# Patient Record
Sex: MALE | Race: WHITE | NOT HISPANIC OR LATINO | Employment: FULL TIME | URBAN - METROPOLITAN AREA
[De-identification: names, ages, dates, MRNs, and addresses within clinical notes are randomized per-mention and may not be internally consistent; named-entity substitution may affect disease eponyms.]

---

## 2017-09-27 ENCOUNTER — APPOINTMENT (OUTPATIENT)
Dept: RADIOLOGY | Facility: MEDICAL CENTER | Age: 42
End: 2017-09-27
Attending: ORTHOPAEDIC SURGERY
Payer: COMMERCIAL

## 2017-09-27 DIAGNOSIS — M23.619: ICD-10-CM

## 2017-09-27 PROCEDURE — 73721 MRI JNT OF LWR EXTRE W/O DYE: CPT | Mod: LT

## 2017-10-25 ENCOUNTER — APPOINTMENT (OUTPATIENT)
Dept: ADMISSIONS | Facility: MEDICAL CENTER | Age: 42
End: 2017-10-25
Attending: ORTHOPAEDIC SURGERY
Payer: COMMERCIAL

## 2017-10-25 RX ORDER — HYDROCODONE BITARTRATE AND ACETAMINOPHEN 5; 325 MG/1; MG/1
1-2 TABLET ORAL EVERY 4 HOURS PRN
COMMUNITY

## 2017-10-25 NOTE — OR NURSING
"Preadmit appointment: \" Preparing for your Procedure information\" sheet given to patient with verbal and written instructions. Patient instructed to continue prescribed medications through the day before surgery, instructed to take the following medications the day of surgery per anesthesia protocol: Hydrocodone if needed.   "

## 2017-11-02 ENCOUNTER — HOSPITAL ENCOUNTER (OUTPATIENT)
Facility: MEDICAL CENTER | Age: 42
End: 2017-11-02
Attending: ORTHOPAEDIC SURGERY | Admitting: ORTHOPAEDIC SURGERY
Payer: COMMERCIAL

## 2017-11-02 VITALS
TEMPERATURE: 98.4 F | SYSTOLIC BLOOD PRESSURE: 131 MMHG | WEIGHT: 173.94 LBS | RESPIRATION RATE: 16 BRPM | BODY MASS INDEX: 25.76 KG/M2 | HEIGHT: 69 IN | HEART RATE: 56 BPM | OXYGEN SATURATION: 95 % | DIASTOLIC BLOOD PRESSURE: 81 MMHG

## 2017-11-02 PROCEDURE — 700101 HCHG RX REV CODE 250

## 2017-11-02 PROCEDURE — 160036 HCHG PACU - EA ADDL 30 MINS PHASE I: Performed by: ORTHOPAEDIC SURGERY

## 2017-11-02 PROCEDURE — 160002 HCHG RECOVERY MINUTES (STAT): Performed by: ORTHOPAEDIC SURGERY

## 2017-11-02 PROCEDURE — 160009 HCHG ANES TIME/MIN: Performed by: ORTHOPAEDIC SURGERY

## 2017-11-02 PROCEDURE — C1713 ANCHOR/SCREW BN/BN,TIS/BN: HCPCS | Performed by: ORTHOPAEDIC SURGERY

## 2017-11-02 PROCEDURE — A6222 GAUZE <=16 IN NO W/SAL W/O B: HCPCS | Performed by: ORTHOPAEDIC SURGERY

## 2017-11-02 PROCEDURE — 500562 HCHG FIBERWIRE: Performed by: ORTHOPAEDIC SURGERY

## 2017-11-02 PROCEDURE — 160041 HCHG SURGERY MINUTES - EA ADDL 1 MIN LEVEL 4: Performed by: ORTHOPAEDIC SURGERY

## 2017-11-02 PROCEDURE — 160025 RECOVERY II MINUTES (STATS): Performed by: ORTHOPAEDIC SURGERY

## 2017-11-02 PROCEDURE — 700105 HCHG RX REV CODE 258: Performed by: ORTHOPAEDIC SURGERY

## 2017-11-02 PROCEDURE — C1762 CONN TISS, HUMAN(INC FASCIA): HCPCS | Performed by: ORTHOPAEDIC SURGERY

## 2017-11-02 PROCEDURE — 500423 HCHG DRESSING, ABD COMBINE: Performed by: ORTHOPAEDIC SURGERY

## 2017-11-02 PROCEDURE — 160029 HCHG SURGERY MINUTES - 1ST 30 MINS LEVEL 4: Performed by: ORTHOPAEDIC SURGERY

## 2017-11-02 PROCEDURE — 700111 HCHG RX REV CODE 636 W/ 250 OVERRIDE (IP)

## 2017-11-02 PROCEDURE — 160022 HCHG BLOCK: Performed by: ORTHOPAEDIC SURGERY

## 2017-11-02 PROCEDURE — 500561: Performed by: ORTHOPAEDIC SURGERY

## 2017-11-02 PROCEDURE — 502580 HCHG PACK, KNEE ARTHROSCOPY: Performed by: ORTHOPAEDIC SURGERY

## 2017-11-02 PROCEDURE — 160047 HCHG PACU  - EA ADDL 30 MINS PHASE II: Performed by: ORTHOPAEDIC SURGERY

## 2017-11-02 PROCEDURE — 160046 HCHG PACU - 1ST 60 MINS PHASE II: Performed by: ORTHOPAEDIC SURGERY

## 2017-11-02 PROCEDURE — 160035 HCHG PACU - 1ST 60 MINS PHASE I: Performed by: ORTHOPAEDIC SURGERY

## 2017-11-02 PROCEDURE — 700102 HCHG RX REV CODE 250 W/ 637 OVERRIDE(OP)

## 2017-11-02 PROCEDURE — 502000 HCHG MISC OR IMPLANTS RC 0278: Performed by: ORTHOPAEDIC SURGERY

## 2017-11-02 PROCEDURE — 501838 HCHG SUTURE GENERAL: Performed by: ORTHOPAEDIC SURGERY

## 2017-11-02 PROCEDURE — A9270 NON-COVERED ITEM OR SERVICE: HCPCS

## 2017-11-02 PROCEDURE — 160048 HCHG OR STATISTICAL LEVEL 1-5: Performed by: ORTHOPAEDIC SURGERY

## 2017-11-02 PROCEDURE — 500028 HCHG ARTHROWAND TURBOVAC 3.5/90 SUCT.: Performed by: ORTHOPAEDIC SURGERY

## 2017-11-02 PROCEDURE — 501507 HCHG SUTURE LASSO: Performed by: ORTHOPAEDIC SURGERY

## 2017-11-02 DEVICE — SCREW BIOSURE 9X25MM: Type: IMPLANTABLE DEVICE | Status: FUNCTIONAL

## 2017-11-02 DEVICE — DEVICE ULTRABUTTON ADJUSTABLE FIXATION: Type: IMPLANTABLE DEVICE | Status: FUNCTIONAL

## 2017-11-02 DEVICE — GRAFT BONE POSTERIOR TIBIALIS TENDON <24CM: Type: IMPLANTABLE DEVICE | Status: FUNCTIONAL

## 2017-11-02 DEVICE — SUTURE ANCHOR FOOTPRINT FIX 4.5MM: Type: IMPLANTABLE DEVICE | Status: FUNCTIONAL

## 2017-11-02 RX ORDER — BUPIVACAINE HYDROCHLORIDE 5 MG/ML
INJECTION, SOLUTION PERINEURAL
Status: DISCONTINUED | OUTPATIENT
Start: 2017-11-02 | End: 2017-11-02 | Stop reason: HOSPADM

## 2017-11-02 RX ORDER — SODIUM CHLORIDE, SODIUM LACTATE, POTASSIUM CHLORIDE, CALCIUM CHLORIDE 600; 310; 30; 20 MG/100ML; MG/100ML; MG/100ML; MG/100ML
1000 INJECTION, SOLUTION INTRAVENOUS
Status: DISCONTINUED | OUTPATIENT
Start: 2017-11-02 | End: 2017-11-02 | Stop reason: HOSPADM

## 2017-11-02 RX ORDER — LIDOCAINE HYDROCHLORIDE 10 MG/ML
INJECTION, SOLUTION INFILTRATION; PERINEURAL
Status: COMPLETED
Start: 2017-11-02 | End: 2017-11-02

## 2017-11-02 RX ORDER — OXYCODONE HYDROCHLORIDE AND ACETAMINOPHEN 5; 325 MG/1; MG/1
TABLET ORAL
Status: COMPLETED
Start: 2017-11-02 | End: 2017-11-02

## 2017-11-02 RX ORDER — ACETAMINOPHEN 500 MG
TABLET ORAL
Status: COMPLETED
Start: 2017-11-02 | End: 2017-11-02

## 2017-11-02 RX ORDER — BACITRACIN 50000 [IU]/1
INJECTION, POWDER, FOR SOLUTION INTRAMUSCULAR
Status: DISCONTINUED | OUTPATIENT
Start: 2017-11-02 | End: 2017-11-02 | Stop reason: HOSPADM

## 2017-11-02 RX ORDER — CELECOXIB 200 MG/1
CAPSULE ORAL
Status: COMPLETED
Start: 2017-11-02 | End: 2017-11-02

## 2017-11-02 RX ADMIN — SODIUM CHLORIDE, POTASSIUM CHLORIDE, SODIUM LACTATE AND CALCIUM CHLORIDE 1000 ML: 600; 310; 30; 20 INJECTION, SOLUTION INTRAVENOUS at 13:05

## 2017-11-02 RX ADMIN — ACETAMINOPHEN 1000 MG: 500 TABLET, COATED ORAL at 13:05

## 2017-11-02 RX ADMIN — CELECOXIB 400 MG: 200 CAPSULE ORAL at 13:05

## 2017-11-02 RX ADMIN — LIDOCAINE HYDROCHLORIDE 0.1 ML: 10 INJECTION, SOLUTION INFILTRATION; PERINEURAL at 13:05

## 2017-11-02 RX ADMIN — OXYCODONE HYDROCHLORIDE AND ACETAMINOPHEN 2 TABLET: 5; 325 TABLET ORAL at 18:14

## 2017-11-02 ASSESSMENT — PAIN SCALES - GENERAL
PAINLEVEL_OUTOF10: 3
PAINLEVEL_OUTOF10: 1
PAINLEVEL_OUTOF10: 3
PAINLEVEL_OUTOF10: 3
PAINLEVEL_OUTOF10: ASSUMED PAIN PRESENT

## 2017-11-03 NOTE — DISCHARGE INSTRUCTIONS
ACTIVITY: Rest and take it easy for the first 24 hours.  A responsible adult is recommended to remain with you during that time.  It is normal to feel sleepy.  We encourage you to not do anything that requires balance, judgment or coordination.    MILD FLU-LIKE SYMPTOMS ARE NORMAL. YOU MAY EXPERIENCE GENERALIZED MUSCLE ACHES, THROAT IRRITATION, HEADACHE AND/OR SOME NAUSEA.    FOR 24 HOURS DO NOT:  Drive, operate machinery or run household appliances.  Drink beer or alcoholic beverages.   Make important decisions or sign legal documents.    SPECIAL INSTRUCTIONS: See attached instruction sheet. Toe touch weight bearing after 24 hours with crutches to left lower extremity.     DIET: To avoid nausea, slowly advance diet as tolerated, avoiding spicy or greasy foods for the first day.  Add more substantial food to your diet according to your physician's instructions. INCREASE FLUIDS AND FIBER TO AVOID CONSTIPATION.    SURGICAL DRESSING/BATHING: Keep dressing clean, dry and intact until instructed otherwise by surgeon    FOLLOW-UP APPOINTMENT:  A follow-up appointment should be arranged with your doctor ; call to schedule.    You should CALL YOUR PHYSICIAN if you develop:  Fever greater than 101 degrees F.  Pain not relieved by medication, or persistent nausea or vomiting.  Excessive bleeding (blood soaking through dressing) or unexpected drainage from the wound.  Extreme redness or swelling around the incision site, drainage of pus or foul smelling drainage.  Inability to urinate or empty your bladder within 8 hours.  Problems with breathing or chest pain.    You should call 911 if you develop problems with breathing or chest pain.  If you are unable to contact your doctor or surgical center, you should go to the nearest emergency room or urgent care center.  Dr Madrid's telephone #: [phone]    If any questions arise, call your doctor.  If your doctor is not available, please feel free to call the Surgical Center at  [phone].  The Center is open Monday through Friday from 7AM to 7PM.  You can also call the HEALTH HOTLINE open 24 hours/day, 7 days/week and speak to a nurse at [phone], or toll free at [phone].    A registered nurse may call you a few days after your surgery to see how you are doing after your procedure.    MEDICATIONS: Resume taking daily medication.  Take prescribed pain medication with food.  If no medication is prescribed, you may take non-aspirin pain medication if needed.  PAIN MEDICATION CAN BE VERY CONSTIPATING.  Take a stool softener or laxative such as senokot, pericolace, or milk of magnesia if needed.    Prescription given for Preoperatively .  Last pain medication given at 6:14pm Percocet 10/650mg    If your physician has prescribed pain medication that includes Acetaminophen (Tylenol), do not take additional Acetaminophen (Tylenol) while taking the prescribed medication.    Peripheral Nerve Block Discharge Instructions from Same Day Surgery and Inpatient :    What to Expect - Lower Extremity  · The block may cause you to experience numbness and weakness in your hip and thigh, thigh and knee or calf and foot on the same side as your surgery  · Numbness, tingling and / or weakness are all normal. For some people, this may be an unpleasant sensation  · These issues will be resolved when the local anesthetic wears off   · You may experience numbness and tingling in your thigh on the same side as your surgery if the block medicine was injected at your groin area  · Numbness will make it difficult to walk  · You may have problems with balance and walking so be very careful   · Follow your surgeon's direction regarding weight bearing on your surgical limb  · Be very careful with your numb limb  Precautions  · The numbness may affect your balance  · Be careful when walking or moving around  · Your leg may be weak: be very careful putting weight on it  · If your surgeon did not specify a  "time, you should not bear weight for 24 hours  · Be sure to ask for help when you need it  · It is better to have help than to fall and hurt yourself  Prevent Injury  · Protect the limb like a baby  · Beware of exposing your limb to extreme heat or cold or trauma  · The limb may be injured without you noticing because it is numb  · Keep the limb elevated whenever possible  · Do not sleep on the limb  · Change the position of the limb regularly  · Avoid putting pressure on your surgical limb  Pain Control  · The initial block on the day of surgery will make your extremity feel \"numb\"  · Any consecutive injection including prior to discharge from the hospital will make your extremity feel \"numb\"  · You may feel an aching or burning when the local anesthesia starts to wear off  · Take pain pills as prescribed by your surgeon  · Call your surgeon or anesthesiologist if you do not have adequate pain control    Depression / Suicide Risk    As you are discharged from this [facility] facility, it is important to learn how to keep safe from harming yourself.    Recognize the warning signs:  · Abrupt changes in personality, positive or negative- including increase in energy   · Giving away possessions  · Change in eating patterns- significant weight changes-  positive or negative  · Change in sleeping patterns- unable to sleep or sleeping all the time   · Unwillingness or inability to communicate  · Depression  · Unusual sadness, discouragement and loneliness  · Talk of wanting to die  · Neglect of personal appearance   · Rebelliousness- reckless behavior  · Withdrawal from people/activities they love  · Confusion- inability to concentrate     If you or a loved one observes any of these behaviors or has concerns about self-harm, here's what you can do:  · Talk about it- your feelings and reasons for harming yourself  · Remove any means that you might use to hurt yourself (examples: pills, rope, extension cords, " firearm)  · Get professional help from the community (Mental Health, Substance Abuse, psychological counseling)  · Do not be alone:Call your Safe Contact- someone whom you trust who will be there for you.  · Call your local CRISIS HOTLINE 490-2038 or 929-835-4851  · Call your local Children's Mobile Crisis Response Team [place] (889) 754-8808 or www.i.TV  · Call the toll free National Suicide Prevention Hotlines   · National Suicide Prevention Lifeline 762-126-BFSN (9732)  National Hope Line Network 800-SUICIDE (955-8774)

## 2017-11-03 NOTE — OR NURSING
1854: Settled in recliner post short ambulation from dalila, pain is tolerable, no nausea, dressing in CDI, immobilizer in place, and polar ice pad in place.  1933: D/Catrachito to care of family post uneventful stay in PACU 2.

## 2017-11-03 NOTE — OP REPORT
DATE OF SERVICE:  11/02/2017    PREOPERATIVE DIAGNOSES:  Left knee anterior cruciate ligament tear and   questionable meniscus tear.    POSTOPERATIVE DIAGNOSES:  Left knee anterior cruciate ligament tear, medial   meniscus tear, lateral meniscus tear, and posterior root disruption.    PROCEDURE:  Left knee ACL reconstruction with soft tissue allograft, partial   medial meniscectomy, and lateral meniscus root repair with partial   meniscectomy of the mid body.    SURGEON:  Chip Mdarid MD    ASSISTANT:  Kayleigh Chao, certified first assist.    ANESTHESIA:  General plus block and local.    ANESTHESIOLOGIST:  Jarrett Conteh MD    TOURNIQUET USE:  None.    BLOOD LOSS:  Minimal.    COMPLICATIONS:  None apparent.    DISPOSITION:  PACU.    CONDITION:  Stable.    INDICATIONS:  The patient is an active 41-year-old male who was playing   basketball at high school, had a noncontact injury for his left knee.    Physical exam consistent with ACL tear, questionable meniscal injury.  ACL   confirmed ACL tear and questionable medial meniscus tear.  We discussed risks,   benefits, and rationale of surgery.  He works physical therapy to achieve   normal range of motion before surgery.  Risks include, but not limited to   infection, neurovascular injury, incomplete relief of symptoms, possible   retear graft, arthrofibrosis, inability to return to pre-injury function, DVT,   PE, and complications of anesthesia.  Informed consent was signed and placed   on the chart.  All of his questions were answered; no guarantees implied or   given.    TECHNIQUE:  Both patient and I agreed to correct upper extremity.  Left leg   was signed and marked in preoperative holding.  He received 2 g IV Ancef   prophylaxis.  I consulted Dr. Jarrett Conteh of anesthesia regarding   perioperative pain management.  He had a discussion with the patient and   consented him for an adductor canal block, which was carried out under sterile    technique in preoperative holding.  The patient was then taken to the   operative suite.  After adequate anesthesia, time-out was taken by all in the   room to identify the correct patient, limb, and procedure.  Examination under   anesthesia showed a normal unstable right knee.  The left knee has a positive   pivot shift, positive Lachman, stable to varus and valgus stress at 0 and 30   degrees, and positive stable posterior drawer.  Tourniquet was placed, not   inflated.  Left leg was sterilely prepped and draped in standard fashion.    Standard arthroscopic portals were established.  Findings were as follows:    Cartilage throughout the knee was intact.  ACL was torn.  PCL was in good   condition.  The medial meniscus had a horizontal cleavage-type tear at the mid   body to posterior body and a small radial tear that went into the root, but   did not disrupt the root.  Lateral meniscus had a radial tear in the mid body   and a disrupted posterior root.  Arthroscopic shaver and biter were used to   perform a partial medial meniscectomy back to stable edges.  Same was done for   the mid body tear of the lateral meniscus and then a posterior root repair   was done.    Kayleigh Chao, certified first assist, assisted with root repair.  She also   prepared the soft tissue allograft at the back table to a size 9.    Arthroscopic ACL guide was used to line up position under the posterior root   for repair.  Shaver was used to debride cartilaginous site, get to good   bleeding bone exposed.  Beath pin was drilled through this site followed by   drilling with the 4.5 Endobutton drill bit as the [employer] meniscal   repair kit was not available in the hospital.  Following this, the straight   FiberWire was then passed up into the joint, was fed through an accessory mid   parapatellar cannula and using a shoulder ____ passer a horizontal mattress   was passed through the posterior root of the lateral meniscus so  that both   limbs of the suture ended up in the inferior aspect of the meniscus.  These   were then passed back through the tunnel and placed into a Bioraptor   footprint.  Following this, attention was then directed at the ACL   reconstruction.  A size 9 tibia was drilled into the anatomic footprint of the   ACL, followed by use of a 7 mm over the top guide and placing the femoral   footprint into the anatomic ACL footprint.  This was drilled to a depth of   roughly 20 mm.  Soft tissue allograft with ultra button was pulled through,   passed and flipped pulled back to show security.  The graft was pulled into   the tunnel roughly 20+ mm, then cycled, was isometric.  Retention in the graft   showed there was no more tension of the head.  A 10 x 25 BioComposite screw   was placed in the tibial tunnel.  The suture limbs were then placed in a   Bioraptor footprint fracture fixation.  Reevaluation showed full extension,   graft to be stable to probing.  The knee was evaluated to remove any loose   debris that was remaining.  Instruments removed.  Soft tissue was closed with   2-0 Vicryl subcutaneously.  Nylon was used to close the portals and staples   for the skin on the tibial incision.  Xeroform soft dressing was applied along   with PolarCare and brace.  Patient was transferred to recovery in stable   condition.  Counts correct.  No apparent complications.  In recovery, toes   were pink and warm with a brisk capillary refill, 2+ dorsalis pedis pulse.    Kayleigh Chao, certified first assist, was essential for successful   completion of the case, could not have been done without her.       ____________________________________     MD EZ England / [place]    DD:  11/02/2017 18:03:20  DT:  11/02/2017 18:29:10    D#:  [id number]  Job#:  [id number]

## 2017-11-03 NOTE — OR SURGEON
Immediate Post OP Note    PreOp Diagnosis: left knee acl tear possible meniscus tear     PostOp Diagnosis: left knee acl, medial and lateral menisci tears     Procedure(s):  ACL RECONSTRUCTION W/ ALLOGRAFT- scope - Wound Class: Clean  MENISCAL REPAIR-arthroscopic - Wound Class: Clean  MEDIAL MENISCECTOMY-partial; partial lateral  menis    Surgeon(s):  Chip Madrid M.D.    Anesthesiologist/Type of Anesthesia:  Anesthesiologist: Jarrett Conteh M.D./General    Surgical Staff:  Assistant: Kayleigh Chao C.N.A.  Circulator: Lisa Blanco R.N.  Scrub Person: Renetta Rivas    Specimens:  * No specimens in log *    Estimated Blood Loss: min    Findings: acl , medial and lateral meniscus tears     Complications: no apparent         11/2/2017 5:55 PM Chip Madrid

## 2017-11-03 NOTE — OR NURSING
"1740 To PACU [place], side rails up x 2 for safety, lungs clear bilaterally, scds on patient and machine operational, dressing CDI to L knee with brace with LLE elevated on gurney and with pillow. +2 L pedal pulse with pink/warm toes and cap refill <3 sec.   1755 Arouses to voice and OPA removed by RN. Breathing easy and unlabored. Denies pain or nausea.   1810 Resting quietly on gurney. Denies nausea but reports \"some pain to L leg\". Plan to give oral PRN pain medication  1825 No changes  1840 Pain rated as tolerable. Denies nausea. Meets criteria for transfer to stage II.   "

## 2018-03-13 ENCOUNTER — APPOINTMENT (OUTPATIENT)
Dept: RADIOLOGY | Facility: MEDICAL CENTER | Age: 43
End: 2018-03-13
Attending: ORTHOPAEDIC SURGERY
Payer: COMMERCIAL

## 2018-03-13 DIAGNOSIS — M23.612 OTH SPON DISRUPT OF ANTERIOR CRUCIATE LIGAMENT OF LEFT KNEE: ICD-10-CM

## 2018-03-13 PROCEDURE — 73721 MRI JNT OF LWR EXTRE W/O DYE: CPT | Mod: LT

## 2018-09-10 ENCOUNTER — APPOINTMENT (OUTPATIENT)
Dept: ADMISSIONS | Facility: MEDICAL CENTER | Age: 43
End: 2018-09-10
Attending: ORTHOPAEDIC SURGERY
Payer: COMMERCIAL

## 2018-09-10 RX ORDER — TRAMADOL HYDROCHLORIDE 50 MG/1
50 TABLET ORAL EVERY 4 HOURS PRN
COMMUNITY

## 2018-09-10 NOTE — OR NURSING
Preadmit appt:  Patient instructed to continue regularly prescribed medications through day before surgery.  Instructed to take the following medications the day of surgery with a sip of water per anesthesia protocol: norco as needed.

## 2018-09-11 ENCOUNTER — HOSPITAL ENCOUNTER (OUTPATIENT)
Facility: MEDICAL CENTER | Age: 43
End: 2018-09-11
Attending: ORTHOPAEDIC SURGERY | Admitting: ORTHOPAEDIC SURGERY
Payer: COMMERCIAL

## 2018-09-11 VITALS
BODY MASS INDEX: 26.55 KG/M2 | HEIGHT: 69 IN | OXYGEN SATURATION: 98 % | WEIGHT: 179.23 LBS | DIASTOLIC BLOOD PRESSURE: 70 MMHG | SYSTOLIC BLOOD PRESSURE: 119 MMHG | RESPIRATION RATE: 16 BRPM | HEART RATE: 56 BPM | TEMPERATURE: 97.7 F

## 2018-09-11 PROCEDURE — 160028 HCHG SURGERY MINUTES - 1ST 30 MINS LEVEL 3: Performed by: ORTHOPAEDIC SURGERY

## 2018-09-11 PROCEDURE — 700111 HCHG RX REV CODE 636 W/ 250 OVERRIDE (IP)

## 2018-09-11 PROCEDURE — 160046 HCHG PACU - 1ST 60 MINS PHASE II: Performed by: ORTHOPAEDIC SURGERY

## 2018-09-11 PROCEDURE — 160048 HCHG OR STATISTICAL LEVEL 1-5: Performed by: ORTHOPAEDIC SURGERY

## 2018-09-11 PROCEDURE — 160002 HCHG RECOVERY MINUTES (STAT): Performed by: ORTHOPAEDIC SURGERY

## 2018-09-11 PROCEDURE — 700102 HCHG RX REV CODE 250 W/ 637 OVERRIDE(OP)

## 2018-09-11 PROCEDURE — 502000 HCHG MISC OR IMPLANTS RC 0278: Performed by: ORTHOPAEDIC SURGERY

## 2018-09-11 PROCEDURE — 160009 HCHG ANES TIME/MIN: Performed by: ORTHOPAEDIC SURGERY

## 2018-09-11 PROCEDURE — 700105 HCHG RX REV CODE 258: Performed by: ORTHOPAEDIC SURGERY

## 2018-09-11 PROCEDURE — 700101 HCHG RX REV CODE 250

## 2018-09-11 PROCEDURE — 501838 HCHG SUTURE GENERAL: Performed by: ORTHOPAEDIC SURGERY

## 2018-09-11 PROCEDURE — A6454 SELF-ADHER BAND W>=3" <5"/YD: HCPCS | Performed by: ORTHOPAEDIC SURGERY

## 2018-09-11 PROCEDURE — 160039 HCHG SURGERY MINUTES - EA ADDL 1 MIN LEVEL 3: Performed by: ORTHOPAEDIC SURGERY

## 2018-09-11 PROCEDURE — A9270 NON-COVERED ITEM OR SERVICE: HCPCS

## 2018-09-11 PROCEDURE — 160025 RECOVERY II MINUTES (STATS): Performed by: ORTHOPAEDIC SURGERY

## 2018-09-11 PROCEDURE — 160035 HCHG PACU - 1ST 60 MINS PHASE I: Performed by: ORTHOPAEDIC SURGERY

## 2018-09-11 PROCEDURE — 160022 HCHG BLOCK: Performed by: ORTHOPAEDIC SURGERY

## 2018-09-11 PROCEDURE — 500881 HCHG PACK, EXTREMITY: Performed by: ORTHOPAEDIC SURGERY

## 2018-09-11 DEVICE — IMPLANTABLE DEVICE: Type: IMPLANTABLE DEVICE | Site: ANKLE | Status: FUNCTIONAL

## 2018-09-11 RX ORDER — HYDROMORPHONE HYDROCHLORIDE 2 MG/ML
INJECTION, SOLUTION INTRAMUSCULAR; INTRAVENOUS; SUBCUTANEOUS
Status: COMPLETED
Start: 2018-09-11 | End: 2018-09-11

## 2018-09-11 RX ORDER — SODIUM CHLORIDE, SODIUM LACTATE, POTASSIUM CHLORIDE, CALCIUM CHLORIDE 600; 310; 30; 20 MG/100ML; MG/100ML; MG/100ML; MG/100ML
INJECTION, SOLUTION INTRAVENOUS
Status: DISCONTINUED | OUTPATIENT
Start: 2018-09-11 | End: 2018-09-11 | Stop reason: HOSPADM

## 2018-09-11 RX ORDER — CEFAZOLIN SODIUM 1 G/3ML
INJECTION, POWDER, FOR SOLUTION INTRAMUSCULAR; INTRAVENOUS
Status: DISCONTINUED | OUTPATIENT
Start: 2018-09-11 | End: 2018-09-11 | Stop reason: HOSPADM

## 2018-09-11 RX ORDER — OXYCODONE HCL 5 MG/5 ML
SOLUTION, ORAL ORAL
Status: COMPLETED
Start: 2018-09-11 | End: 2018-09-11

## 2018-09-11 RX ADMIN — HYDROMORPHONE HYDROCHLORIDE 0.2 MG: 2 INJECTION, SOLUTION INTRAMUSCULAR; INTRAVENOUS; SUBCUTANEOUS at 17:33

## 2018-09-11 RX ADMIN — FENTANYL CITRATE 50 MCG: 50 INJECTION, SOLUTION INTRAMUSCULAR; INTRAVENOUS at 17:13

## 2018-09-11 RX ADMIN — FENTANYL CITRATE 50 MCG: 50 INJECTION, SOLUTION INTRAMUSCULAR; INTRAVENOUS at 17:24

## 2018-09-11 RX ADMIN — SODIUM CHLORIDE, POTASSIUM CHLORIDE, SODIUM LACTATE AND CALCIUM CHLORIDE: 600; 310; 30; 20 INJECTION, SOLUTION INTRAVENOUS at 14:06

## 2018-09-11 RX ADMIN — OXYCODONE HYDROCHLORIDE 10 MG: 5 SOLUTION ORAL at 17:15

## 2018-09-11 ASSESSMENT — PAIN SCALES - GENERAL
PAINLEVEL_OUTOF10: 2
PAINLEVEL_OUTOF10: 8
PAINLEVEL_OUTOF10: 8
PAINLEVEL_OUTOF10: 2
PAINLEVEL_OUTOF10: 0
PAINLEVEL_OUTOF10: 7

## 2018-09-11 NOTE — OR NURSING
Pt allergies and NPO status verified, home meds reconcilled. Belongings secured. Pt verbalizes understanding of the pain scale, expected course of stay, and plan of care.  Surgical site verified with pt.  IV access established.  Sequential/ ana placed on L leg. Pt had shaved his R leg to prep for sugery 2 days prior, small scab noted to R LE.

## 2018-09-12 NOTE — OR NURSING
1700- Patient arrived from OR. Respirations even and unlabored. Patient arousable to voice. Surgical dressing in place to RLE. C/D/I. Pulses 2+ throughout. Nail beds pink with brisk cap refill. Patient able to wiggle toes. Reports decreased sensation to RLE.   1715- Patient reporting 8/10 pain. Pain medication administered, see MAR and anesthesia record. Declines any nausea.   1740- Pain reassessed. Patient reported 2/10 tolerable pain and declines need for additional pain medication.   1756- VSS, see flowsheets. Pain is tolerable and denies any nausea. Patient transferred to Stage 2.   1810- Patient dressed and assisted to recliner chair with assistance of RN. VSS, see flowsheets.   1828-Discharge instructions provided to patient and wife. Both verbalized understanding. All questions and concerns addressed. Patient D/Catrachito to care of family following an uneventful stay in Stage 2.

## 2018-09-12 NOTE — OP REPORT
DATE OF SERVICE:  09/11/2018    PREOPERATIVE DIAGNOSIS:  Right Achilles tendon rupture.    POSTOPERATIVE DIAGNOSIS:  Right Achilles tendon rupture.    PROCEDURE:  Right Achilles tendon rupture.    PROCEDURE:  Right Achilles tendon repair using PARS system.    SURGEON:  Chip Madrid MD    ASSISTANT:  Kayleigh Chao, certified first assist.    ANESTHESIA:  Block plus general.    ANESTHESIOLOGIST:  John Cuevas MD    BLOOD LOSS:  Minimal.    COMPLICATIONS:  None.    TOURNIQUET TIME:  41 minutes.    DISPOSITION:  PACU.    CONDITION:  Stable.    INDICATIONS:  The patient is an active 42-year-old male who suffered a right   Achilles tendon rupture playing basketball.  Physical exam was consistent with   the fact of positive Smith sign.  I discussed with him regarding risks,   benefits, and rationale of closed treatment and operative treatment.  He   elected to proceed with operative fixation of Achilles tendon rupture.  We   discussed risks, benefits, and rationale risk including, but not limited to   infection, neurovascular injury, incomplete relief of symptoms, postoperative   arthrofibrosis, equinus contracture, DVT, PE, complications of anesthesia,   inability to return to pre-injury state, need for postoperative physical   therapy.  Informed consent was signed and placed in the chart.  All his   questions were answered; no guarantees implied or given.    TECHNIQUE:  I agreed to the correct operative extremity in the preoperative   holding.  The right ankle was signed and marked in the preoperative holding.    He was given 2 g of IV Ancef prophylaxis.  I consulted Dr. Ghulam Cuevas of   anesthesia regarding perioperative pain management.  He consented the patient.    The block was carried under sterile technique without complication.  Patient   was taken off the suite.  After adequate anesthesia, time-out was taken by   all in room to identify the correct patient, limb, and procedure.  He was   placed in  prone position with prominences well padded.  The right lower   extremity was sterilely prepped and draped in standard fashion.  Thigh high   tourniquet was placed.  Limb was exsanguinated, tourniquet inflated to 250   mmHg pressure.  Transverse incision was made just proximal to the defect.    Peritenon was entered.  Stump of the proximal Achilles was brought into the   wound.  The pars guide was placed, 3 sutures were placed, one was a locking   suture, same was done for the distal Achilles with 3 sutures placed, one being   a locking suture.  They were tied sequentially and in maximum plantarflexion.    The wound was copiously irrigated.  The paratenon and subcutaneous tissue   was closed with 3-0 Vicryl and 3-0 Monocryl was used to close the skin   followed by Steri-Strips, Xeroform, and soft dressing.  The patient was placed   in a posterior splint with side bars.  Toes are pink and warm with brisk cap   refill and he is transferred to recovery in stable condition.  Counts were   correct, no apparent complications.  Total tourniquet hour was 41 minutes, it   was let down before final closure.  There was no significant bleeding.    Kayleigh Chao, certified first assist, was essential for successful   completion of the case.  It could not have been done without her.       ____________________________________     MD EZ England / [place]    DD:  09/11/2018 17:22:21  DT:  09/11/2018 17:50:39    D#:  [id number]  Job#:  [id number]

## 2018-09-12 NOTE — OR SURGEON
Immediate Post OP Note    PreOp Diagnosis: right achilles rupture     PostOp Diagnosis: same     Procedure(s):  ACHILLES TENDON REPAIR - PERCUTANEOUS SYSTEM - Wound Class: Clean    Surgeon(s):  Chip Madrid M.D.    Anesthesiologist/Type of Anesthesia:  Anesthesiologist: John Cuevas M.D./General    Surgical Staff:  Circulator: aWle Cruz R.N.  Scrub Person: Cole Turner  Private Scrub: Kayleigh Chao CFA    Specimens removed if any:  * No specimens in log *    Estimated Blood Loss: minimal     Findings: right achilles rupture     Complications: no apparent         9/11/2018 5:12 PM Chip Madrid M.D.

## 2018-09-12 NOTE — DISCHARGE INSTRUCTIONS
ACTIVITY: Rest and take it easy for the first 24 hours.  A responsible adult is recommended to remain with you during that time.  It is normal to feel sleepy.  We encourage you to not do anything that requires balance, judgment or coordination.    MILD FLU-LIKE SYMPTOMS ARE NORMAL. YOU MAY EXPERIENCE GENERALIZED MUSCLE ACHES, THROAT IRRITATION, HEADACHE AND/OR SOME NAUSEA.    FOR 24 HOURS DO NOT:  Drive, operate machinery or run household appliances.  Drink beer or alcoholic beverages.   Make important decisions or sign legal documents.    SPECIAL INSTRUCTIONS: Ice and elevate. Non-weight bearing. Mobilize with crutches.     DIET: To avoid nausea, slowly advance diet as tolerated, avoiding spicy or greasy foods for the first day.  Add more substantial food to your diet according to your physician's instructions. INCREASE FLUIDS AND FIBER TO AVOID CONSTIPATION.    SURGICAL DRESSING/BATHING: Refer to Dr. Madrid's printed discharge instructions.    FOLLOW-UP APPOINTMENT:  A follow-up appointment should be arranged with your doctor on September 24th; call to verify date and time.    You should CALL YOUR PHYSICIAN if you develop:  Fever greater than 101 degrees F.  Pain not relieved by medication, or persistent nausea or vomiting.  Excessive bleeding (blood soaking through dressing) or unexpected drainage from the wound.  Extreme redness or swelling around the incision site, drainage of pus or foul smelling drainage.  Inability to urinate or empty your bladder within 8 hours.  Problems with breathing or chest pain.    You should call [phone] if you develop problems with breathing or chest pain.  If you are unable to contact your doctor or surgical center, you should go to the nearest emergency room or urgent care center.  Physician's telephone #: [phone] Dr. Madrid    If any questions arise, call your doctor.  If your doctor is not available, please feel free to call the Surgical Center at [phone].  The Center is  open Monday through Friday from 7AM to 7PM.  You can also call the HEALTH HOTLINE open 24 hours/day, 7 days/week and speak to a nurse at [phone], or toll free at [phone].    A registered nurse may call you a few days after your surgery to see how you are doing after your procedure.    MEDICATIONS: Resume taking daily medication.  Take prescribed pain medication with food.  If no medication is prescribed, you may take non-aspirin pain medication if needed.  PAIN MEDICATION CAN BE VERY CONSTIPATING.  Take a stool softener or laxative such as senokot, pericolace, or milk of magnesia if needed.    Prescription given for Oxycodone.  Last pain medication given at 5:15 PM.    If your physician has prescribed pain medication that includes Acetaminophen (Tylenol), do not take additional Acetaminophen (Tylenol) while taking the prescribed medication.    Depression / Suicide Risk    As you are discharged from this [facility] facility, it is important to learn how to keep safe from harming yourself.    Recognize the warning signs:  · Abrupt changes in personality, positive or negative- including increase in energy   · Giving away possessions  · Change in eating patterns- significant weight changes-  positive or negative  · Change in sleeping patterns- unable to sleep or sleeping all the time   · Unwillingness or inability to communicate  · Depression  · Unusual sadness, discouragement and loneliness  · Talk of wanting to die  · Neglect of personal appearance   · Rebelliousness- reckless behavior  · Withdrawal from people/activities they love  · Confusion- inability to concentrate     If you or a loved one observes any of these behaviors or has concerns about self-harm, here's what you can do:  · Talk about it- your feelings and reasons for harming yourself  · Remove any means that you might use to hurt yourself (examples: pills, rope, extension cords, firearm)  · Get professional help from the community (Mental  Health, Substance Abuse, psychological counseling)  · Do not be alone:Call your Safe Contact- someone whom you trust who will be there for you.  · Call your local CRISIS HOTLINE 183-5432 or 698-276-7079  · Call your local Children's Mobile Crisis Response Team [place] (401) 440-3581 or www.tagUin  · Call the toll free National Suicide Prevention Hotlines   · National Suicide Prevention Lifeline 655-631-AASU (2148)  · [employer] Hope Line Network 800-SUICIDE (229-6037)        Peripheral Nerve Block Discharge Instructions from Same Day Surgery and Inpatient :    What to Expect - Lower Extremity  · The block may cause you to experience numbness and weakness in your calf and foot on the same side as your surgery  · Numbness, tingling and / or weakness are all normal. For some people, this may be an unpleasant sensation  · These issues will be resolved when the local anesthetic wears off   · You may experience numbness and tingling in your thigh on the same side as your surgery if the block medicine was injected at your groin area  · Numbness will make it difficult to walk  · You may have problems with balance and walking so be very careful   · Follow your surgeon's direction regarding weight bearing on your surgical limb  · Be very careful with your numb limb  Precautions  · The numbness may affect your balance  · Be careful when walking or moving around  · Your leg may be weak: be very careful putting weight on it  · If your surgeon did not specify a time, you should not bear weight for 24 hours  · Be sure to ask for help when you need it  · It is better to have help than to fall and hurt yourself  Prevent Injury  · Protect the limb like a baby  · Beware of exposing your limb to extreme heat or cold or trauma  · The limb may be injured without you noticing because it is numb  · Keep the limb elevated whenever possible  · Do not sleep on the limb  · Change the position of the limb regularly  · Avoid  "putting pressure on your surgical limb  Pain Control  · The initial block on the day of surgery will make your extremity feel \"numb\"  · Any consecutive injection including prior to discharge from the hospital will make your extremity feel \"numb\"  · You may feel an aching or burning when the local anesthesia starts to wear off  · Take pain pills as prescribed by your surgeon  · Call your surgeon or anesthesiologist if you do not have adequate pain control    "

## (undated) DEVICE — GLOVE BIOGEL INDICATOR SZ 8 SURGICAL PF LTX - (50/BX 4BX/CA)

## (undated) DEVICE — SODIUM CHL IRRIGATION 0.9% 1000ML (12EA/CA)

## (undated) DEVICE — PEN SKIN MARKER W/RULER - (50EA/BX)

## (undated) DEVICE — CHLORAPREP 26 ML APPLICATOR - ORANGE TINT(25/CA)

## (undated) DEVICE — PADDING CAST 4 IN STERILE - 4 X 4 YDS (24/CA)

## (undated) DEVICE — GLOVE, LITE (PAIR)

## (undated) DEVICE — ELECTRODE DUAL RETURN W/ CORD - (50/PK)

## (undated) DEVICE — PROTECTOR ULNA NERVE - (36PR/CA)

## (undated) DEVICE — BANDAGE ELASTIC STERILE VELCRO 6 X 5 YDS (25EA/CA)

## (undated) DEVICE — NEPTUNE 4 PORT MANIFOLD - (20/PK)

## (undated) DEVICE — SUTURE GENERAL

## (undated) DEVICE — GLOVE BIOGEL PI INDICATOR SZ 6.5 SURGICAL PF LF - (50/BX 4BX/CA)

## (undated) DEVICE — CANISTER SUCTION RIGID RED 1500CC (40EA/CA)

## (undated) DEVICE — KIT ROOM DECONTAMINATION

## (undated) DEVICE — SENSOR SPO2 NEO LNCS ADHESIVE (20/BX) SEE USER NOTES

## (undated) DEVICE — HUMID-VENT HEAT AND MOISTURE EXCHANGE- (50/BX)

## (undated) DEVICE — GLOVE SZ 7.5 LF PROTEXIS (50PR/BX)

## (undated) DEVICE — GLOVE BIOGEL SZ 7.5 SURGICAL PF LTX - (50PR/BX 4BX/CA)

## (undated) DEVICE — GOWN WARMING STANDARD FLEX - (30/CA)

## (undated) DEVICE — BAG, SPONGE COUNT 50600

## (undated) DEVICE — MASK AIRWAY SIZE 4 UNIQUE SILICON (10EA/BX)

## (undated) DEVICE — SYRINGE 10 ML CONTROL LL (25EA/BX 4BX/CA)

## (undated) DEVICE — TUBE CONNECTING SUCTION - CLEAR PLASTIC STERILE 72 IN (50EA/CA)

## (undated) DEVICE — PACK MINOR BASIN - (2EA/CA)

## (undated) DEVICE — ELECTRODE 850 FOAM ADHESIVE - HYDROGEL RADIOTRNSPRNT (50/PK)

## (undated) DEVICE — WATER IRRIGATION STERILE 1000ML (12EA/CA)

## (undated) DEVICE — HEADREST PRONEVIEW LARGE - (10/CA)

## (undated) DEVICE — SUTURE 2-0 VICRYL PLUS CT-1 36 (36PK/BX)"

## (undated) DEVICE — MASK ANESTHESIA ADULT  - (100/CA)

## (undated) DEVICE — DRESSING ABDOMINAL PAD STERILE 8 X 10" (360EA/CA)"

## (undated) DEVICE — TOURNIQUET CUFF 24 X 4 ONE PORT - STERILE (10/BX)

## (undated) DEVICE — SUCTION INSTRUMENT YANKAUER BULBOUS TIP W/O VENT (50EA/CA)

## (undated) DEVICE — SUTURE 3-0 ETHILON FS-1 - (36/BX) 30 INCH

## (undated) DEVICE — DRL BIT4.5 STR ENDOSCPC CANN

## (undated) DEVICE — DRAPE LARGE 3 QUARTER - (20/CA)

## (undated) DEVICE — PACK LOWER EXTREMITY - (2/CA)

## (undated) DEVICE — SHAVER4.0 AGGRESSIVE + FORMLA (5EA/BX)

## (undated) DEVICE — GLOVE BIOGEL PI ULTRATOUCH SZ 7.0 SURGICAL PF LF- POWDER FREE (50/BX 4BX/CA)

## (undated) DEVICE — BLOCK

## (undated) DEVICE — TUBE E-T HI-LO CUFF 7.0MM (10EA/PK)

## (undated) DEVICE — SUTURE 3-0 MONOCRYL PLUS PS-1 - 27 INCH (36/BX)

## (undated) DEVICE — LACTATED RINGERS INJ 1000 ML - (14EA/CA 60CA/PF)

## (undated) DEVICE — SUTURE RETRIEVER HEWSON LIGA - 6/BX

## (undated) DEVICE — HEAD HOLDER JUNIOR/ADULT

## (undated) DEVICE — GOWN SURGICAL X-LARGE ULTRA - FILM-REINFORCED (20/CA)

## (undated) DEVICE — GLOVE BIOGEL SZ 6.5 SURGICAL PF LTX (50PR/BX 4BX/CA)

## (undated) DEVICE — FIBERSTICK - 5/BX

## (undated) DEVICE — WRAP CO-FLEX 4IN X 5YD STERIL - SELF-ADHERENT (18/CA)

## (undated) DEVICE — CLOSURE SKIN STRIP 1/2 X 4 IN - (STERI STRIP) (50/BX 4BX/CA)

## (undated) DEVICE — GLOVE BIOGEL PI ULTRATOUCH SZ 7.5 SURGICAL PF LF -(50/BX 4BX/CA)

## (undated) DEVICE — PADDING CAST 6 IN STERILE - 6 X 4 YDS (24/CA)

## (undated) DEVICE — FIBERWIRE 2.0 (12EA/BX)

## (undated) DEVICE — KIT ANESTHESIA W/CIRCUIT & 3/LT BAG W/FILTER (20EA/CA)

## (undated) DEVICE — BLADE SURGICAL #15 - (50/BX 3BX/CA)

## (undated) DEVICE — DRESSING XEROFORM 1X8 - (50/BX 4BX/CA)

## (undated) DEVICE — PACK KNEE ARTHROSCOPY SM OR - (2EA/CA)

## (undated) DEVICE — GLOVE BIOGEL SZ 7 SURGICAL PF LTX - (50PR/BX 4BX/CA)

## (undated) DEVICE — DRAPE U ORTHOPEDIC - (10/BX)

## (undated) DEVICE — ARTHROWAND TURBOVAC 3.5/90 SCT

## (undated) DEVICE — ARMREST CRADLE FOAM - (2PR/PK 12PR/CA)

## (undated) DEVICE — GLOVE BIOGEL PI INDICATOR SZ 8.0 SURGICAL PF LF -(50/BX 4BX/CA)

## (undated) DEVICE — SUTURE LASSO CORKSCREW LT

## (undated) DEVICE — SUTURE 3-0 VICRYL PLUS FS-1 27 (36PK/BX)"

## (undated) DEVICE — TOURNIQUET CUFF 34 X 4 ONE PORT DISP - STERILE (10/BX)

## (undated) DEVICE — SODIUM CHL. IRRIGATION 0.9% 3000ML (4EA/CA 65CA/PF)

## (undated) DEVICE — SUTURE 2-0 VICRYL PLUS SH - 27 INCH (36/BX)

## (undated) DEVICE — SHAVER, 5.5 RESECTOR